# Patient Record
(demographics unavailable — no encounter records)

---

## 2025-04-09 NOTE — PHYSICAL EXAM
[Fully active, able to carry on all pre-disease performance without restriction] : Status 0 - Fully active, able to carry on all pre-disease performance without restriction [General Appearance - Alert] : alert [General Appearance - In No Acute Distress] : in no acute distress [General Appearance - Well Nourished] : well nourished [Sclera] : the sclera and conjunctiva were normal [Extraocular Movements] : extraocular movements were intact [Outer Ear] : the ears and nose were normal in appearance [Hearing Threshold Finger Rub Not Assumption] : hearing was normal [Neck Appearance] : the appearance of the neck was normal [Neck Cervical Mass (___cm)] : no neck mass was observed [] : no respiratory distress [Respiration, Rhythm And Depth] : normal respiratory rhythm and effort [Exaggerated Use Of Accessory Muscles For Inspiration] : no accessory muscle use [Auscultation Breath Sounds / Voice Sounds] : lungs were clear to auscultation bilaterally [Heart Rate And Rhythm] : heart rate was normal and rhythm regular [Examination Of The Chest] : the chest was normal in appearance [Chest Visual Inspection Thoracic Asymmetry] : no chest asymmetry [Diminished Respiratory Excursion] : normal chest expansion [2+] : left 2+ [Breast Appearance] : normal in appearance [Breast Palpation Mass] : no palpable masses [Bowel Sounds] : normal bowel sounds [Abdomen Soft] : soft [Abdomen Tenderness] : non-tender [FreeTextEntry1] : Deferred [Cervical Lymph Nodes Enlarged Posterior Bilaterally] : posterior cervical [Cervical Lymph Nodes Enlarged Anterior Bilaterally] : anterior cervical [Supraclavicular Lymph Nodes Enlarged Bilaterally] : supraclavicular [No CVA Tenderness] : no ~M costovertebral angle tenderness [No Spinal Tenderness] : no spinal tenderness [Involuntary Movements] : no involuntary movements were seen [Skin Color & Pigmentation] : normal skin color and pigmentation [No Focal Deficits] : no focal deficits [Oriented To Time, Place, And Person] : oriented to person, place, and time

## 2025-04-09 NOTE — HISTORY OF PRESENT ILLNESS
[FreeTextEntry1] : Mr. ANDREAS SHER, 44 year old male, Never a smoker, w/ no significant past medical. April, 2025 presented to ED with complaint of chest tightness and discomfort  x 2 months - worsening for the last couple of days as well as worsening with sneezing. 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 Referred by cancer navigation for further evaluation and treatment.   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   Patient presents today for consultation. Today, patient denies worsening SOB, chest pain, cough, hemoptysis, fever, chills, night sweats, lightheadedness or dizziness.

## 2025-04-09 NOTE — ASSESSMENT
[FreeTextEntry1] : Mr. ANDREAS SHER, 44 year old male, Never a smoker, w/ no significant past medical. April, 2025 presented to ED with complaint of chest tightness and discomfort  x 2 months - worsening for the last couple of days as well as worsening with sneezing. 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 Referred by cancer navigation for further evaluation and treatment.   I have independently reviewed the medical records and imaging at the time of this office consultation, and discussed the following interpretations with the patient: - CT imaging reviewed. Discussed necessity of establishing care with Head and Neck specialist for further evaluation and treatment. Referred to Dr. Arnold. Patient is agreeable and will make an appointment within the next few weeks.   Recommendations reviewed with patient during this office visit, and all questions answered; Patient instructed on the importance of follow up and verbalizes understanding.  I, FROY Barron, personally performed the evaluation and management (E/M) services for this new patient. That E/M includes conducting the initial examination, assessing all conditions, and establishing the plan of care. Today, My ACP, Shira Taylor, was here to observe my evaluation and management services for this patient to be followed going forward.